# Patient Record
Sex: FEMALE | Race: WHITE
[De-identification: names, ages, dates, MRNs, and addresses within clinical notes are randomized per-mention and may not be internally consistent; named-entity substitution may affect disease eponyms.]

---

## 2020-07-24 ENCOUNTER — TRANSCRIPTION ENCOUNTER (OUTPATIENT)
Age: 60
End: 2020-07-24

## 2021-01-05 ENCOUNTER — TRANSCRIPTION ENCOUNTER (OUTPATIENT)
Age: 61
End: 2021-01-05

## 2021-02-21 ENCOUNTER — TRANSCRIPTION ENCOUNTER (OUTPATIENT)
Age: 61
End: 2021-02-21

## 2022-03-09 PROBLEM — Z00.00 ENCOUNTER FOR PREVENTIVE HEALTH EXAMINATION: Status: ACTIVE | Noted: 2022-03-09

## 2022-04-12 ENCOUNTER — APPOINTMENT (OUTPATIENT)
Dept: ORTHOPEDIC SURGERY | Facility: CLINIC | Age: 62
End: 2022-04-12

## 2022-04-27 ENCOUNTER — APPOINTMENT (OUTPATIENT)
Dept: ORTHOPEDIC SURGERY | Facility: CLINIC | Age: 62
End: 2022-04-27

## 2022-04-27 ENCOUNTER — APPOINTMENT (OUTPATIENT)
Dept: ORTHOPEDIC SURGERY | Facility: CLINIC | Age: 62
End: 2022-04-27
Payer: COMMERCIAL

## 2022-04-27 DIAGNOSIS — Z78.9 OTHER SPECIFIED HEALTH STATUS: ICD-10-CM

## 2022-04-27 PROBLEM — Z00.00 ENCOUNTER FOR PREVENTIVE HEALTH EXAMINATION: Noted: 2022-04-27

## 2022-04-27 PROCEDURE — 99214 OFFICE O/P EST MOD 30 MIN: CPT

## 2022-04-28 NOTE — DISCUSSION/SUMMARY
[de-identified] : Patient will continue physical therapy. \par \par Patient will follow up in 6 weeks \par \par -----------------------------------------------\par Home Exercise\par The patient is instructed on a home exercise program.\par \par MARGARITA GRAVES Acting as a Scribe for Dr. Goins\par I, Margarita Graves, attest that this documentation has been prepared under the direction and in the presence of Provider Marko Goins MD.\par \par Activity Modification\par The patient was advised to modify their activities.\par \par Dx / Natural History\par The patient was advised of the diagnosis.  The natural history of the pathology was explained in full to the patient in layman's terms.  Several different treatment options were discussed and explained in full to the patient including the risks and benefits of both surgical and non-surgical treatments.  All questions and concerns were answered.\par \par Pain Guide Activities\par The patient was advised to let pain guide the gradual advancement of activities.\par \par RICE\par I explained to the patient that rest, ice, compression, and elevation would benefit them.  They may return to activity after follow-up or when they no longer have any pain.
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
## 2022-04-28 NOTE — PHYSICAL EXAM
[de-identified] : Constitutional: The general appearance of the patient is well developed, well nourished, no deformities and well groomed.\par \par Gait: Gait and function is as follows: normal gait.\par \par Skin: Head and neck visualized skin is normal. Left lower extremity visualized skin is normal Right lower extremity visualized skin is normal. Thoracic Skin of the thoracic spine shows visualized skin is normal.\par \par Cardiovascular: palpable dorsalis pedis pulse bilaterally, good capillary refill in the digits of the bilateral lower extremities and no temperature or color changes in the bilateral lower extremities.\par \par Lymphatic: Normal Palpation of lymph nodes in the inguinal.\par \par Neurologic: heel rub from knee to ankle intact in the bilateral lower extremities. Deep Tendon Reflexes in Upper and Lower Extremities The reflexes are present and symmetrical in the bilateral lower extremities and No clonus in the lower extremities. The patient is oriented to time, place and person. Sensation to light touch intact in the bilateral lower extremities. Mood and Affect is normal.  \par \par Left Knee: Palpation of the knee is as follows: lateral tibial plateau tenderness.

## 2022-05-06 NOTE — ASSESSMENT
[FreeTextEntry1] : Antonina 3/8/22 LEFT KNEE MRI\par Nondisplaced articular fracture in the lateral tibial plateau\par Full thickness chondral defect in the lateral tibial plateau\par Sprain/partial tear of the proximal medial collateral ligament [facility] Assessment Completed on: 06-May-2022 11:36

## 2022-06-15 ENCOUNTER — APPOINTMENT (OUTPATIENT)
Dept: ORTHOPEDIC SURGERY | Facility: CLINIC | Age: 62
End: 2022-06-15
Payer: COMMERCIAL

## 2022-06-15 PROCEDURE — 99214 OFFICE O/P EST MOD 30 MIN: CPT

## 2022-06-15 PROCEDURE — 73564 X-RAY EXAM KNEE 4 OR MORE: CPT | Mod: RT

## 2022-06-15 RX ORDER — LIDOCAINE 5% 700 MG/1
5 PATCH TOPICAL
Qty: 30 | Refills: 0 | Status: ACTIVE | COMMUNITY
Start: 2022-06-15 | End: 1900-01-01

## 2022-06-15 NOTE — DISCUSSION/SUMMARY
[de-identified] : Patient reports 50% improvement in left knee pain.\par Patient will continue physical therapy.\par Patient will follow up in 6 weeks \par Prescribed Lidocaine patches. \par \par -----------------------------------------------\par Home Exercise\par The patient is instructed on a home exercise program.\par \par MARGARITA GRAVES Acting as a Scribe for Dr. Goins\par I, Margarita Graves, attest that this documentation has been prepared under the direction and in the presence of Provider Marko Goins MD.\par \par Activity Modification\par The patient was advised to modify their activities.\par \par Dx / Natural History\par The patient was advised of the diagnosis.  The natural history of the pathology was explained in full to the patient in layman's terms.  Several different treatment options were discussed and explained in full to the patient including the risks and benefits of both surgical and non-surgical treatments.  All questions and concerns were answered.\par \par Pain Guide Activities\par The patient was advised to let pain guide the gradual advancement of activities.\par \par RICE\par I explained to the patient that rest, ice, compression, and elevation would benefit them.  They may return to activity after follow-up or when they no longer have any pain.

## 2022-06-15 NOTE — ASSESSMENT
[FreeTextEntry1] : Antonina 3/8/22 LEFT KNEE MRI\par Nondisplaced articular fracture in the lateral tibial plateau\par Full thickness chondral defect in the lateral tibial plateau\par Sprain/partial tear of the proximal medial collateral ligament
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
## 2022-06-15 NOTE — PHYSICAL EXAM
[de-identified] : Constitutional: The general appearance of the patient is well developed, well nourished, no deformities and well groomed.\par \par Gait: Gait and function is as follows: normal gait.\par \par Skin: Head and neck visualized skin is normal. Left lower extremity visualized skin is normal Right lower extremity visualized skin is normal. Thoracic Skin of the thoracic spine shows visualized skin is normal.\par \par Cardiovascular: palpable dorsalis pedis pulse bilaterally, good capillary refill in the digits of the bilateral lower extremities and no temperature or color changes in the bilateral lower extremities.\par \par Lymphatic: Normal Palpation of lymph nodes in the inguinal.\par \par Neurologic: heel rub from knee to ankle intact in the bilateral lower extremities. Deep Tendon Reflexes in Upper and Lower Extremities The reflexes are present and symmetrical in the bilateral lower extremities and No clonus in the lower extremities. The patient is oriented to time, place and person. Sensation to light touch intact in the bilateral lower extremities. Mood and Affect is normal.  \par \par Left Knee: Palpation of the knee is as follows: lateral tibial plateau tenderness. \par \par X-Ray Left Knee 6/15/22: 4-view: Unchanged from prior.

## 2022-07-27 ENCOUNTER — APPOINTMENT (OUTPATIENT)
Dept: ORTHOPEDIC SURGERY | Facility: CLINIC | Age: 62
End: 2022-07-27

## 2022-07-27 PROCEDURE — 73564 X-RAY EXAM KNEE 4 OR MORE: CPT | Mod: LT

## 2022-07-27 PROCEDURE — 99214 OFFICE O/P EST MOD 30 MIN: CPT

## 2022-07-27 NOTE — ASSESSMENT
[FreeTextEntry1] : SHAHID 3/8/22 LEFT KNEE MRI\par Nondisplaced articular fracture in the lateral tibial plateau\par Full thickness chondral defect in the lateral tibial plateau\par Sprain/partial tear of the proximal medial collateral ligament

## 2022-07-27 NOTE — PHYSICAL EXAM
[de-identified] : Constitutional: The general appearance of the patient is well developed, well nourished, no deformities and well groomed.\par \par Gait: Gait and function is as follows: normal gait.\par \par Skin: Head and neck visualized skin is normal. Left lower extremity visualized skin is normal Right lower extremity visualized skin is normal. Thoracic Skin of the thoracic spine shows visualized skin is normal.\par \par Cardiovascular: palpable dorsalis pedis pulse bilaterally, good capillary refill in the digits of the bilateral lower extremities and no temperature or color changes in the bilateral lower extremities.\par \par Lymphatic: Normal Palpation of lymph nodes in the inguinal.\par \par Neurologic: heel rub from knee to ankle intact in the bilateral lower extremities. Deep Tendon Reflexes in Upper and Lower Extremities The reflexes are present and symmetrical in the bilateral lower extremities and No clonus in the lower extremities. The patient is oriented to time, place and person. Sensation to light touch intact in the bilateral lower extremities. Mood and Affect is normal.  \par \par Left Knee: Palpation of the knee is as follows: lateral tibial plateau tenderness. \par \par X-Ray Left Knee 6/15/22: 4-view: Unchanged from prior.

## 2022-07-27 NOTE — DISCUSSION/SUMMARY
[de-identified] : Patient has done 4 months of recent physical therapy. She has tried physical therapy, anti-inflammatories, rest, with no improvement. She has significant pain that is affecting her ability to complete her daily activities. \par \par Follow up after MRI of left knee to eval resolution of fracture. \par \par -----------------------------------------------\par Home Exercise\par The patient is instructed on a home exercise program.\par \par MARGARITA GRAVES Acting as a Scribe for Dr. Goins\par I, Margarita Graves, attest that this documentation has been prepared under the direction and in the presence of Provider Marko Goins MD.\par \par Activity Modification\par The patient was advised to modify their activities.\par \par Dx / Natural History\par The patient was advised of the diagnosis.  The natural history of the pathology was explained in full to the patient in layman's terms.  Several different treatment options were discussed and explained in full to the patient including the risks and benefits of both surgical and non-surgical treatments.  All questions and concerns were answered.\par \par Pain Guide Activities\par The patient was advised to let pain guide the gradual advancement of activities.\par \par RICE\par I explained to the patient that rest, ice, compression, and elevation would benefit them.  They may return to activity after follow-up or when they no longer have any pain.
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
## 2022-08-02 ENCOUNTER — FORM ENCOUNTER (OUTPATIENT)
Age: 62
End: 2022-08-02

## 2022-08-03 ENCOUNTER — APPOINTMENT (OUTPATIENT)
Dept: MRI IMAGING | Facility: CLINIC | Age: 62
End: 2022-08-03

## 2022-08-03 PROCEDURE — 73721 MRI JNT OF LWR EXTRE W/O DYE: CPT | Mod: LT

## 2022-08-24 ENCOUNTER — APPOINTMENT (OUTPATIENT)
Dept: ORTHOPEDIC SURGERY | Facility: CLINIC | Age: 62
End: 2022-08-24

## 2022-08-24 DIAGNOSIS — S89.92XD UNSPECIFIED INJURY OF LEFT LOWER LEG, SUBSEQUENT ENCOUNTER: ICD-10-CM

## 2022-08-24 DIAGNOSIS — S82.142D DISPLACED BICONDYLAR FRACTURE OF LEFT TIBIA, SUBSEQUENT ENCOUNTER FOR CLOSED FRACTURE WITH ROUTINE HEALING: ICD-10-CM

## 2022-08-24 DIAGNOSIS — M25.562 PAIN IN LEFT KNEE: ICD-10-CM

## 2022-08-24 PROCEDURE — 99214 OFFICE O/P EST MOD 30 MIN: CPT

## 2022-08-24 NOTE — DISCUSSION/SUMMARY
[de-identified] : Reviewed MRI imaging results with patient. Subchondral fracture was healed. 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 \par \par Patient was offered hyaluronic acid injection. Patient wishes to continue with at home exercises, anti-inflammatories, and lidocaine patches.\par \par Patient will let us know if she would like to proceed with injection. \par \par Followup as needed. \par \par -----------------------------------------------\par Home Exercise\par The patient is instructed on a home exercise program.\par \par Antoni Richardson Acting as a Scribe for Dr. Goins\par I, Antoni Richardson, attest that this documentation has been prepared under the direction and in the presence of Provider Marko Goins MD.\par \par Activity Modification\par The patient was advised to modify their activities.\par \par Dx / Natural History\par The patient was advised of the diagnosis.  The natural history of the pathology was explained in full to the patient in layman's terms.  Several different treatment options were discussed and explained in full to the patient including the risks and benefits of both surgical and non-surgical treatments.  All questions and concerns were answered.\par \par Pain Guide Activities\par The patient was advised to let pain guide the gradual advancement of activities.\par \par RICE\par I explained to the patient that rest, ice, compression, and elevation would benefit them.  They may return to activity after follow-up or when they no longer have any pain.

## 2022-08-24 NOTE — IMAGING
[de-identified] : Hussein Ko 8/8/22 LEFT KNEE MRI \par 1. Tricompartmental chondral loss with slight subchondral edema in the superior central patella, lateral femor\par trochlea, and lateral tibial plateau centrally.\par 2. Medial meniscal degeneration without tear.\par 3. Mild chronic ACL sprain.\par 4. Mild extensor mechanism tendinopathy and mild prepatellar soft tissue swelling.\par 5. Degeneration and fraying of the lateral meniscus within the body without tear.

## 2022-08-24 NOTE — PHYSICAL EXAM
[de-identified] : Constitutional: The general appearance of the patient is well developed, well nourished, no deformities and well groomed.\par \par Gait: Gait and function is as follows: normal gait.\par \par Skin: Head and neck visualized skin is normal. Left lower extremity visualized skin is normal Right lower extremity visualized skin is normal. Thoracic Skin of the thoracic spine shows visualized skin is normal.\par \par Cardiovascular: palpable dorsalis pedis pulse bilaterally, good capillary refill in the digits of the bilateral lower extremities and no temperature or color changes in the bilateral lower extremities.\par \par Lymphatic: Normal Palpation of lymph nodes in the inguinal.\par \par Neurologic: heel rub from knee to ankle intact in the bilateral lower extremities. Deep Tendon Reflexes in Upper and Lower Extremities The reflexes are present and symmetrical in the bilateral lower extremities and No clonus in the lower extremities. The patient is oriented to time, place and person. Sensation to light touch intact in the bilateral lower extremities. Mood and Affect is normal.  \par \par Left Knee: Palpation of the knee is as follows: lateral tibial plateau tenderness. \par \par X-Ray Left Knee 6/15/22: 4-view: Unchanged from prior.
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
## 2022-08-25 PROBLEM — M25.562 ACUTE PAIN OF LEFT KNEE: Status: ACTIVE | Noted: 2022-04-27

## 2022-08-25 PROBLEM — S89.92XD INJURY OF LEFT KNEE, SUBSEQUENT ENCOUNTER: Status: ACTIVE | Noted: 2022-04-27

## 2022-08-25 PROBLEM — S82.142D CLOSED FRACTURE OF LEFT TIBIAL PLATEAU WITH ROUTINE HEALING, SUBSEQUENT ENCOUNTER: Status: ACTIVE | Noted: 2022-04-27
